# Patient Record
Sex: FEMALE | Race: WHITE
[De-identification: names, ages, dates, MRNs, and addresses within clinical notes are randomized per-mention and may not be internally consistent; named-entity substitution may affect disease eponyms.]

---

## 2019-03-24 ENCOUNTER — HOSPITAL ENCOUNTER (OUTPATIENT)
Dept: HOSPITAL 82 - ED | Age: 64
Setting detail: OBSERVATION
LOS: 1 days | Discharge: HOME | End: 2019-03-25
Attending: INTERNAL MEDICINE | Admitting: INTERNAL MEDICINE
Payer: MEDICARE

## 2019-03-24 VITALS — WEIGHT: 79.37 LBS | HEIGHT: 65 IN | BODY MASS INDEX: 13.22 KG/M2

## 2019-03-24 VITALS — SYSTOLIC BLOOD PRESSURE: 143 MMHG | DIASTOLIC BLOOD PRESSURE: 85 MMHG

## 2019-03-24 DIAGNOSIS — Z99.3: ICD-10-CM

## 2019-03-24 DIAGNOSIS — F32.9: ICD-10-CM

## 2019-03-24 DIAGNOSIS — R64: ICD-10-CM

## 2019-03-24 DIAGNOSIS — R42: ICD-10-CM

## 2019-03-24 DIAGNOSIS — F41.9: ICD-10-CM

## 2019-03-24 DIAGNOSIS — M24.575: ICD-10-CM

## 2019-03-24 DIAGNOSIS — M24.574: ICD-10-CM

## 2019-03-24 DIAGNOSIS — M19.90: ICD-10-CM

## 2019-03-24 DIAGNOSIS — R53.1: ICD-10-CM

## 2019-03-24 DIAGNOSIS — I10: ICD-10-CM

## 2019-03-24 DIAGNOSIS — G89.29: Primary | ICD-10-CM

## 2019-03-24 DIAGNOSIS — M06.9: ICD-10-CM

## 2019-03-24 DIAGNOSIS — M24.562: ICD-10-CM

## 2019-03-24 DIAGNOSIS — E03.9: ICD-10-CM

## 2019-03-24 DIAGNOSIS — M24.561: ICD-10-CM

## 2019-03-24 LAB
ALBUMIN SERPL-MCNC: 3.3 G/DL (ref 3.2–5)
ALP SERPL-CCNC: 95 U/L (ref 38–126)
ANION GAP SERPL CALCULATED.3IONS-SCNC: 14 MMOL/L
AST SERPL-CCNC: 20 U/L (ref 9–36)
BASOPHILS NFR BLD AUTO: 0 % (ref 0–3)
BILIRUB UR QL STRIP.AUTO: NEGATIVE
BUN SERPL-MCNC: 14 MG/DL (ref 8–23)
BUN/CREAT SERPL: 16
CHLORIDE SERPL-SCNC: 108 MMOL/L (ref 95–108)
CK SERPL-CCNC: 70 U/L (ref 30–165)
CO2 SERPL-SCNC: 24 MMOL/L (ref 22–30)
COLOR UR AUTO: YELLOW
CREAT SERPL-MCNC: 0.8 MG/DL (ref 0.5–1)
EOSINOPHIL NFR BLD AUTO: 0 % (ref 0–8)
ERYTHROCYTE [DISTWIDTH] IN BLOOD BY AUTOMATED COUNT: 15.7 % (ref 11.5–15.5)
GLUCOSE UR STRIP.AUTO-MCNC: NEGATIVE MG/DL
HCT VFR BLD AUTO: 34.9 % (ref 37–47)
HGB BLD-MCNC: 11 G/DL (ref 12–16)
HGB UR QL STRIP.AUTO: (no result)
IMM GRANULOCYTES NFR BLD: 0.4 % (ref 0–5)
KETONES UR STRIP.AUTO-MCNC: NEGATIVE MG/DL
LEUKOCYTE ESTERASE UR QL STRIP.AUTO: NEGATIVE
LYMPHOCYTES NFR BLD: 13 % (ref 15–41)
MCH RBC QN AUTO: 28.4 PG  CALC (ref 26–32)
MCHC RBC AUTO-ENTMCNC: 31.5 G/L CALC (ref 32–36)
MCV RBC AUTO: 89.9 FL  CALC (ref 80–100)
MONOCYTES NFR BLD AUTO: 7 % (ref 2–13)
NEUTROPHILS # BLD AUTO: 6.36 THOU/UL (ref 2–7.15)
NEUTROPHILS NFR BLD AUTO: 79 % (ref 42–76)
NITRITE UR QL STRIP.AUTO: NEGATIVE
PH UR STRIP.AUTO: 6 [PH] (ref 4.5–8)
PLATELET # BLD AUTO: 439 THOU/UL (ref 130–400)
POTASSIUM SERPL-SCNC: 4.4 MMOL/L (ref 3.5–5.1)
PROT SERPL-MCNC: 7.2 G/DL (ref 6.3–8.2)
PROT UR QL STRIP.AUTO: NEGATIVE MG/DL
RBC # BLD AUTO: 3.88 MILL/UL (ref 4.2–5.6)
SODIUM SERPL-SCNC: 141 MMOL/L (ref 137–146)
SP GR UR STRIP.AUTO: 1.01
UROBILINOGEN UR QL STRIP.AUTO: 0.2 E.U./DL

## 2019-03-24 NOTE — NUR
CONSULT FOR PT. PT STATES "I AM OUT OF MY PAIN MEDICATION
BECAUSE MY NEPHEW, PJ, STEALS MY PAIN MEDICATION. LAST NIGHT I HAD TO TAKE
BENADRYL BECAUSE I WAS GOING THROUGH WITHDRAWAL AS ALL MY PAIN MEDICATIONS ARE
MISSING AND I HAVE NONE TO TAKE WHEN I NEED THEM.

## 2019-03-24 NOTE — NUR
[facility] dispatch advised of pt stating that her nephem PJ takes her medications.
Pt states she has Dilaudid, Klonopin and Fentanyl, buthe only takes the
dilaudid and fentanyl. Explained to pt that we will have to report this to the
police because its against the law to take medication that isn't prescribed to
you. Pt states " I don't want to get him into trouble, but he takes it from
me." Advised again that it was against the law and I would bel notifying the
police. "Well he gets mad so I just give him the bottles and he takes what he
wants" Explained to pt that she is also committing a crime by distributing
medication without a license to someone that it isn't written for. "How do I
let him know since I don't know his cellphone number?" Advised she could talk
to the police when they get here, but she should be ashamed of herself for
condoning that behavior.

## 2019-03-24 NOTE — NUR
PT REQUESTED SLEEPING PILL DURING ASSESSMENT. WRITER NOTED PT TO BE VERY
DROWSY. VS TAKEN AT THIS TIME 140/81 85 99.3 18 96% ON RA. . PT NOT ABLE
TO KEEP EYES OPEN TO ANSWER QUESTIONS. SLEEPING PILL NOT GIVEN AT THIS TIME.
WILL CONTINUE TO MONITOR.

## 2019-03-24 NOTE — NUR
REPORT CALLED TO FLOOR FOR CONTINUATION OF CARE.  PT REPORT GIVEN TO CESIA ANDRE.
FOR CARE UNTIL PT IS READY TO BE TAKEN TO MED SURG

## 2019-03-24 NOTE — NUR
PT HAS SEVERE RHEUMOTOID ARTHRITIS, RIGHT HAND HAND IS BENT AND CRIPPLED
LOOKING, PT APPEARS TO BE EMACIATED.  SHE STATES SHE LIVES WITH NEPHEW BUT
HASNT FELT LIKE EATING OR DRINKING FOR "AWHILE"

## 2019-03-24 NOTE — NUR
PT REMAINS ALERT/ORIENTED X3,  NO VOMITING SINCE ARRIVAL. VITAL SIGNS STABLE. 
CALL LIGHT WITHIN REACH

## 2019-03-24 NOTE — NUR
PT ARRIVED TO FLOOR VIA STRETCHER ACCOMPAINED BY ER STAFF. PT ALERT AND
ORIENTED. EMS SITE NOTED TO [facility] SITE APPEARS HEALTHY. SKIN INTACT. PT
NON-AMBULATORY TRANSFERED FROM STRETCHER TO BED X2 PERSON ASSIST. PT WEARS
BRIEF FOR PERIODS OF INCONTINENCE. ORIENTED TO ROOM AND CALL LIGHT SYSTEM.
DISCUSSED POC. PT VERBALIZED UNDERSTANDING. CALL LIGHT WITHIN REACH. WILL
CONTINUE TO MONITOR.

## 2019-03-25 VITALS — SYSTOLIC BLOOD PRESSURE: 159 MMHG | DIASTOLIC BLOOD PRESSURE: 93 MMHG

## 2019-03-25 VITALS — SYSTOLIC BLOOD PRESSURE: 133 MMHG | DIASTOLIC BLOOD PRESSURE: 76 MMHG

## 2019-03-25 VITALS — SYSTOLIC BLOOD PRESSURE: 146 MMHG | DIASTOLIC BLOOD PRESSURE: 92 MMHG

## 2019-03-25 VITALS — DIASTOLIC BLOOD PRESSURE: 76 MMHG | SYSTOLIC BLOOD PRESSURE: 123 MMHG

## 2019-03-25 LAB
ALBUMIN SERPL-MCNC: 2.9 G/DL (ref 3.2–5)
ALP SERPL-CCNC: 89 U/L (ref 38–126)
ANION GAP SERPL CALCULATED.3IONS-SCNC: 12 MMOL/L
AST SERPL-CCNC: 19 U/L (ref 9–36)
BASOPHILS NFR BLD AUTO: 0 % (ref 0–3)
BUN SERPL-MCNC: 11 MG/DL (ref 8–23)
BUN/CREAT SERPL: 15
CHLORIDE SERPL-SCNC: 107 MMOL/L (ref 95–108)
CO2 SERPL-SCNC: 23 MMOL/L (ref 22–30)
CREAT SERPL-MCNC: 0.8 MG/DL (ref 0.5–1)
EOSINOPHIL NFR BLD AUTO: 0 % (ref 0–8)
ERYTHROCYTE [DISTWIDTH] IN BLOOD BY AUTOMATED COUNT: 15.8 % (ref 11.5–15.5)
HCT VFR BLD AUTO: 33.4 % (ref 37–47)
HGB BLD-MCNC: 10.5 G/DL (ref 12–16)
IMM GRANULOCYTES NFR BLD: 0.3 % (ref 0–5)
LYMPHOCYTES NFR BLD: 27 % (ref 15–41)
MCH RBC QN AUTO: 28.1 PG  CALC (ref 26–32)
MCHC RBC AUTO-ENTMCNC: 31.4 G/L CALC (ref 32–36)
MCV RBC AUTO: 89.3 FL  CALC (ref 80–100)
MONOCYTES NFR BLD AUTO: 7 % (ref 2–13)
NEUTROPHILS # BLD AUTO: 5.91 THOU/UL (ref 2–7.15)
NEUTROPHILS NFR BLD AUTO: 66 % (ref 42–76)
PLATELET # BLD AUTO: 495 THOU/UL (ref 130–400)
POTASSIUM SERPL-SCNC: 3.7 MMOL/L (ref 3.5–5.1)
PROT SERPL-MCNC: 6.7 G/DL (ref 6.3–8.2)
RBC # BLD AUTO: 3.74 MILL/UL (ref 4.2–5.6)
SODIUM SERPL-SCNC: 138 MMOL/L (ref 137–146)

## 2019-03-25 NOTE — NUR
PT RESTING IN RIGHT SIDE LAYING POSITION,A&O X3;VS OBTAINED AND ASSESSMENT
COMPLETED;PT REPORTS CHRONIC PAIN, PRN PAIN MEDICATION PROVIDED BUT PT REFUSED
AT THIS TIME;RESPIRATIONS EVEN AND UNLABORED ON RA,CLEAR LUNG SOUNDS;ABDOMEN
SOFT ON PALPATION AND ACTIVE IN ALL 4 QUADRANTS;STRONG PEDAL PULSES;SKIN
INTACT;EMS #20G TO [facility] INFUSING D5 1/2 NS @ 100ML/HR,SITE APPEARS HEALTHY;TELE
MONITORING IN PLACE;PT DENIES ANY ADDITIONAL NEEDS AT THIS TIME AND IS
ENCOURAGED TO CALL FOR ASSISTANCE IF NEEDED;FALL PRECAUTIONS IN PLACE WITH
CALL LIGHT IN REACH;WILL CONTINUE TO MONITOR

## 2019-03-25 NOTE — NUR
PT AWAKE, ALERT AND ORIENTED. PT REPOSITIONED IN BED. PT REQUESTING DILUADID.
EDUCATED PT ON MEDICATIONS ORDERED BY PHYSICIAN. WILL ADMINISTER APAP,
CONTINUE TO MONITOR AND REASSESS.

## 2019-03-25 NOTE — NUR
Discharge instructions given. Patient verbalizes understanding of same.
Discharged in stable condition via Wheelchair to  with
*Other. All belongings sent with pt.

## 2019-03-25 NOTE — NUR
REPORT RECEIVED FROM LUIS ALBERTO MARIE;PT APPEARS TO BE SLEEPING IN SUPINE
POSITION;RESPIRATIONS APPEAR EVEN AND UNLABORED ON RA;NO S/S OF DISTRESS
NOTED;CONTACT PRECAUTIONS IN PLACE;FALL PRECAUTIONS NOTED WITH BED IN THE
LOWEST POSITION AND CALL LIGHT IN REACH;WILL CONTINUE TO MONITOR

## 2019-03-25 NOTE — NUR
ALL DISCHARGE INSTRUCTIONS PROVIDED AT THIS TIME,QUESTIONS ANSWERED;IV SITE
REMOVED WITH CATHETER INTACT;AWAITING TRANSPORTATION HOME.

## 2019-03-25 NOTE — NUR
PT RESTING IN SEMI FOWLERS POSITION;RESPIRATIONS EVEN AND UNLABORED ON RA;PT
DENIES ANY CURRENT NEEDS;IV SITE TO [facility] SALINE LOCKED AT THIS TIME PER
ORDER;TELE MONITORING IN PLACE;PT ENCOURAGED TO CALL FOR ASSISTANCE IF
NEEDED;FALL PRECAUTIONS IN PLACE WITH CALL LIGHT IN REACH;WILL CONTINUE TO
MONITOR

## 2019-03-26 ENCOUNTER — HOSPITAL ENCOUNTER (INPATIENT)
Dept: HOSPITAL 82 - ED | Age: 64
LOS: 2 days | Discharge: HOME | DRG: 92 | End: 2019-03-28
Attending: INTERNAL MEDICINE | Admitting: INTERNAL MEDICINE
Payer: MEDICARE

## 2019-03-26 VITALS — BODY MASS INDEX: 10.39 KG/M2 | HEIGHT: 65 IN | WEIGHT: 62.39 LBS

## 2019-03-26 VITALS — DIASTOLIC BLOOD PRESSURE: 96 MMHG | SYSTOLIC BLOOD PRESSURE: 148 MMHG

## 2019-03-26 DIAGNOSIS — M24.562: ICD-10-CM

## 2019-03-26 DIAGNOSIS — H51.9: ICD-10-CM

## 2019-03-26 DIAGNOSIS — R63.6: ICD-10-CM

## 2019-03-26 DIAGNOSIS — M62.50: ICD-10-CM

## 2019-03-26 DIAGNOSIS — M06.9: ICD-10-CM

## 2019-03-26 DIAGNOSIS — D63.8: ICD-10-CM

## 2019-03-26 DIAGNOSIS — F32.9: ICD-10-CM

## 2019-03-26 DIAGNOSIS — M19.90: ICD-10-CM

## 2019-03-26 DIAGNOSIS — G89.29: Primary | ICD-10-CM

## 2019-03-26 DIAGNOSIS — F11.20: ICD-10-CM

## 2019-03-26 DIAGNOSIS — F41.9: ICD-10-CM

## 2019-03-26 DIAGNOSIS — M24.561: ICD-10-CM

## 2019-03-26 DIAGNOSIS — M24.575: ICD-10-CM

## 2019-03-26 DIAGNOSIS — I10: ICD-10-CM

## 2019-03-26 DIAGNOSIS — M24.574: ICD-10-CM

## 2019-03-26 LAB
ALBUMIN SERPL-MCNC: 3.4 G/DL (ref 3.2–5)
ALP SERPL-CCNC: 103 U/L (ref 38–126)
ANION GAP SERPL CALCULATED.3IONS-SCNC: 15 MMOL/L
AST SERPL-CCNC: 34 U/L (ref 9–36)
BARBITURATES UR-MCNC: NEGATIVE UG/ML
BASOPHILS NFR BLD AUTO: 0 % (ref 0–3)
BILIRUB UR QL STRIP.AUTO: NEGATIVE
BUN SERPL-MCNC: 17 MG/DL (ref 8–23)
BUN/CREAT SERPL: 23
CHLORIDE SERPL-SCNC: 108 MMOL/L (ref 95–108)
CO2 SERPL-SCNC: 18 MMOL/L (ref 22–30)
COCAINE UR-MCNC: NEGATIVE NG/ML
COLOR UR AUTO: YELLOW
CREAT SERPL-MCNC: 0.7 MG/DL (ref 0.5–1)
EOSINOPHIL NFR BLD AUTO: 0 % (ref 0–8)
ERYTHROCYTE [DISTWIDTH] IN BLOOD BY AUTOMATED COUNT: 16 % (ref 11.5–15.5)
GLUCOSE UR STRIP.AUTO-MCNC: NEGATIVE MG/DL
HCT VFR BLD AUTO: 39.4 % (ref 37–47)
HGB BLD-MCNC: 12 G/DL (ref 12–16)
HGB UR QL STRIP.AUTO: (no result)
IMM GRANULOCYTES NFR BLD: 2 % (ref 0–5)
KETONES UR STRIP.AUTO-MCNC: (no result) MG/DL
LEUKOCYTE ESTERASE UR QL STRIP.AUTO: NEGATIVE
LYMPHOCYTES NFR BLD: 8 % (ref 15–41)
MCH RBC QN AUTO: 27.9 PG  CALC (ref 26–32)
MCHC RBC AUTO-ENTMCNC: 30.5 G/L CALC (ref 32–36)
MCV RBC AUTO: 91.6 FL  CALC (ref 80–100)
METHADONE SERPL-MCNC: NEGATIVE NG/ML
MONOCYTES NFR BLD AUTO: 4 % (ref 2–13)
NEUTROPHILS # BLD AUTO: 16.42 THOU/UL (ref 2–7.15)
NEUTROPHILS NFR BLD AUTO: 86 % (ref 42–76)
NITRITE UR QL STRIP.AUTO: NEGATIVE
OXCYCODONE: NEGATIVE
PH UR STRIP.AUTO: 6 [PH] (ref 4.5–8)
PLATELET # BLD AUTO: 412 THOU/UL (ref 130–400)
POTASSIUM SERPL-SCNC: 3 MMOL/L (ref 3.5–5.1)
PROT SERPL-MCNC: 7.6 G/DL (ref 6.3–8.2)
PROT UR QL STRIP.AUTO: NEGATIVE MG/DL
RBC # BLD AUTO: 4.3 MILL/UL (ref 4.2–5.6)
SODIUM SERPL-SCNC: 139 MMOL/L (ref 137–146)
SP GR UR STRIP.AUTO: 1.02
TETRAHYDROCANNABIONOL: NEGATIVE
TRICYLIC ANTIDEPRESSANTS: POSITIVE
UROBILINOGEN UR QL STRIP.AUTO: 0.2 E.U./DL

## 2019-03-26 NOTE — NUR
PTS SISTERS AT BEDSIDE. VSS. PT AFEBRILE. COMPLETED 1L EMS IV FLUIDS.PT ALERT
AND RESPONDS APPROPRIATELY

## 2019-03-26 NOTE — NUR
PT ARRIVED VIA EMS STRETCHER TO RM 9, ALERT AND RESPONSIVE, VERBALLY
APPROPRIATE, NOT ACCOMPANIED BY FAMILY OR FRIENDS.

## 2019-03-26 NOTE — NUR
63 yr old white female admitted to [facility] as med surg tele overflow. transferred
x2 to bed. bed weight obtained. pt admits to "no pain medicine @ home." also
admits "transport company couldn't take me to the appointment."

## 2019-03-26 NOTE — NUR
CLEANSED OF DARK LOOSE STOOL AND UA VIA STRAIGHT CATH PERFORMED. PT TOELRATED
FAIR. PT STATES SHE HAS BEEN "OUT OF MY PAIN MEDICINE FOR 24HOURS".

## 2019-03-26 NOTE — NUR
PT HAS SLIGHT SWELLING TO RT UPPER EYELID. PT DENIES FALL OR INJURY. PT ALERT
AND ORIENTED. MAEW. NIH NEGATIVE.

## 2019-03-26 NOTE — NUR
PATIENT INFORMED OF PLANS TO ADMIT. MEDICATED WITH PO KCL AND SECOND ABX GIVEN.
PATIENT C/O INCREASED BACK PAIN. STATES SHE RAN OUT OF HER FENTANYL AND
DILAUDID ON MONDAY.

## 2019-03-27 VITALS — SYSTOLIC BLOOD PRESSURE: 153 MMHG | DIASTOLIC BLOOD PRESSURE: 83 MMHG

## 2019-03-27 VITALS — DIASTOLIC BLOOD PRESSURE: 84 MMHG | SYSTOLIC BLOOD PRESSURE: 143 MMHG

## 2019-03-27 VITALS — SYSTOLIC BLOOD PRESSURE: 127 MMHG | DIASTOLIC BLOOD PRESSURE: 71 MMHG

## 2019-03-27 VITALS — DIASTOLIC BLOOD PRESSURE: 85 MMHG | SYSTOLIC BLOOD PRESSURE: 145 MMHG

## 2019-03-27 VITALS — DIASTOLIC BLOOD PRESSURE: 81 MMHG | SYSTOLIC BLOOD PRESSURE: 147 MMHG

## 2019-03-27 VITALS — DIASTOLIC BLOOD PRESSURE: 76 MMHG | SYSTOLIC BLOOD PRESSURE: 140 MMHG

## 2019-03-27 VITALS — DIASTOLIC BLOOD PRESSURE: 76 MMHG | SYSTOLIC BLOOD PRESSURE: 151 MMHG

## 2019-03-27 LAB
ALBUMIN SERPL-MCNC: 2.6 G/DL (ref 3.2–5)
ALP SERPL-CCNC: 84 U/L (ref 38–126)
ANION GAP SERPL CALCULATED.3IONS-SCNC: 12 MMOL/L
AST SERPL-CCNC: 26 U/L (ref 9–36)
BASOPHILS NFR BLD AUTO: 0 % (ref 0–3)
BUN SERPL-MCNC: 12 MG/DL (ref 8–23)
BUN/CREAT SERPL: 19
CHLORIDE SERPL-SCNC: 115 MMOL/L (ref 95–108)
CO2 SERPL-SCNC: 16 MMOL/L (ref 22–30)
CREAT SERPL-MCNC: 0.6 MG/DL (ref 0.5–1)
EOSINOPHIL NFR BLD AUTO: 0 % (ref 0–8)
ERYTHROCYTE [DISTWIDTH] IN BLOOD BY AUTOMATED COUNT: 16 % (ref 11.5–15.5)
HCT VFR BLD AUTO: 32.5 % (ref 37–47)
HGB BLD-MCNC: 10.2 G/DL (ref 12–16)
IMM GRANULOCYTES NFR BLD: 0.7 % (ref 0–5)
LYMPHOCYTES NFR BLD: 10 % (ref 15–41)
MCH RBC QN AUTO: 28.4 PG  CALC (ref 26–32)
MCHC RBC AUTO-ENTMCNC: 31.4 G/L CALC (ref 32–36)
MCV RBC AUTO: 90.5 FL  CALC (ref 80–100)
MONOCYTES NFR BLD AUTO: 5 % (ref 2–13)
NEUTROPHILS # BLD AUTO: 13.96 THOU/UL (ref 2–7.15)
NEUTROPHILS NFR BLD AUTO: 84 % (ref 42–76)
PLATELET # BLD AUTO: 349 THOU/UL (ref 130–400)
POTASSIUM SERPL-SCNC: 3.7 MMOL/L (ref 3.5–5.1)
PROT SERPL-MCNC: 6.2 G/DL (ref 6.3–8.2)
RBC # BLD AUTO: 3.59 MILL/UL (ref 4.2–5.6)
SODIUM SERPL-SCNC: 139 MMOL/L (ref 137–146)

## 2019-03-27 NOTE — NUR
Patient is resting in bed. Patient complaints of pain 7/10.
V/S wnl. Patient given prn po dilaudid for pain. Breath sounds
clear. Patient is very frail and week. She is contracted in her hands and
legs. Skin intact. No S&S of distress. Patient repositioned for comfort. Will
continue to monitor patient progress.

## 2019-03-27 NOTE — NUR
PT STATES NO APPETITE FOR BREAKFAST.  PT COMPLAINS OF PAIN TO LOWER
EXTREMITIES AND BACK, REPOSITIONED FOR COMFORT.

## 2019-03-28 VITALS — DIASTOLIC BLOOD PRESSURE: 81 MMHG | SYSTOLIC BLOOD PRESSURE: 139 MMHG

## 2019-03-28 VITALS — DIASTOLIC BLOOD PRESSURE: 79 MMHG | SYSTOLIC BLOOD PRESSURE: 135 MMHG

## 2019-03-28 VITALS — SYSTOLIC BLOOD PRESSURE: 140 MMHG | DIASTOLIC BLOOD PRESSURE: 89 MMHG

## 2019-03-28 LAB
ALBUMIN SERPL-MCNC: 2.5 G/DL (ref 3.2–5)
ALP SERPL-CCNC: 76 U/L (ref 38–126)
ANION GAP SERPL CALCULATED.3IONS-SCNC: 11 MMOL/L
AST SERPL-CCNC: 18 U/L (ref 9–36)
BASOPHILS NFR BLD AUTO: 0 % (ref 0–3)
BILIRUB UR QL STRIP.AUTO: NEGATIVE
BUN SERPL-MCNC: 9 MG/DL (ref 8–23)
BUN/CREAT SERPL: 16
CHLORIDE SERPL-SCNC: 113 MMOL/L (ref 95–108)
CLARITY UR: (no result)
CO2 SERPL-SCNC: 18 MMOL/L (ref 22–30)
COLOR UR AUTO: YELLOW
CREAT SERPL-MCNC: 0.6 MG/DL (ref 0.5–1)
EOSINOPHIL NFR BLD AUTO: 0 % (ref 0–8)
ERYTHROCYTE [DISTWIDTH] IN BLOOD BY AUTOMATED COUNT: 16.6 % (ref 11.5–15.5)
GLUCOSE UR STRIP.AUTO-MCNC: NEGATIVE MG/DL
HCT VFR BLD AUTO: 31.8 % (ref 37–47)
HGB BLD-MCNC: 10 G/DL (ref 12–16)
HGB UR QL STRIP.AUTO: (no result)
IMM GRANULOCYTES NFR BLD: 1.6 % (ref 0–5)
KETONES UR STRIP.AUTO-MCNC: (no result) MG/DL
LEUKOCYTE ESTERASE UR QL STRIP.AUTO: NEGATIVE
LYMPHOCYTES NFR BLD: 13 % (ref 15–41)
MCH RBC QN AUTO: 28.3 PG  CALC (ref 26–32)
MCHC RBC AUTO-ENTMCNC: 31.4 G/L CALC (ref 32–36)
MCV RBC AUTO: 90.1 FL  CALC (ref 80–100)
MONOCYTES NFR BLD AUTO: 6 % (ref 2–13)
NEUTROPHILS # BLD AUTO: 7.54 THOU/UL (ref 2–7.15)
NEUTROPHILS NFR BLD AUTO: 79 % (ref 42–76)
NITRITE UR QL STRIP.AUTO: NEGATIVE
PH UR STRIP.AUTO: 5 [PH] (ref 4.5–8)
PLATELET # BLD AUTO: 292 THOU/UL (ref 130–400)
POTASSIUM SERPL-SCNC: 3.2 MMOL/L (ref 3.5–5.1)
PROT SERPL-MCNC: 5.9 G/DL (ref 6.3–8.2)
PROT UR STRIP.AUTO-MCNC: NEGATIVE MG/DL
RBC # BLD AUTO: 3.53 MILL/UL (ref 4.2–5.6)
SODIUM SERPL-SCNC: 139 MMOL/L (ref 137–146)
SP GR UR STRIP.AUTO: 1.02
SQUAMOUS URNS QL MICRO: (no result) EPI/HPF
UROBILINOGEN UR QL STRIP.AUTO: 0.2 E.U./DL

## 2019-03-28 NOTE — NUR
CONFIRMED PLACEMENT BACK HOME WITH [facility], CASE MANAGEMENT. STATED PT HAS PRIOR
PLACEMENT TO REHAB & REFUSED PLACEMENT IN REHAB AGAIN. STATED NEPHEW LIVES
WITH PT TO HELP CARE FOR HER. DISCUSSED OPTIONS FOR ALTERNATE PLACEMENT D/T
CONCERNS ABOUT ABILITY TO TAKE CARE OF SELF & RAPID READMIT AFTER LAST DC.
NOTIFIED PT CAN BE TRANSFERED HOME BY WC AFTER CALLING [employer].

## 2019-03-28 NOTE — NUR
PT LEAVES FOR HOME VIA [employer]SP[employer], VERBALIZES UNDERSTANDING OF DC
INSTRUCTIONS.  [facility] CALLED EARLIER AND WAS UPDATED ON IMPENDING
DISCHARGE, STATES THAT SOMEONE WILL BE AT HER HOUSE WHEN SHE IS DELIVERED
THERE.

## 2019-03-28 NOTE — NUR
PT SEEN AWAKE, ALERT THIS MORNING.  SHE DOES HAVE TREMORS TODAY
INTERMITTENTLY.  PT DRANK ENSURE THIS MORNING, LATER A CUP OF APPLESAUCE.  PT
SEEN BY KECIA TODAY, PLAN IS TO DISCHARGE BACK HOME TODAY.

## 2019-03-28 NOTE — NUR
CALLED [phone] FOR TRANSPORT HOME. MARIJA STATES [facility] WILL NEED TO BORROW
A WC TO TAKE PT HOME BUT WILL BRING IT BACK. WILL BE HERE AS SOON AS THEY CAN
[facility] CASE MANAGJONAH, AWARE OF BORROWING WC, NEED INFO TO HOLD TRANSPORT
ACCOUNTABLE.

## 2019-03-28 NOTE — NUR
Patient resting in bed. No S&S of distress. No change in previous assessment.
Will continue to monitor patient progress.

## 2024-10-29 NOTE — NUR
PT C/O NOT BEING ABLE TO SLEEP REQUESTING SLEEPING PILL AGAIN. REORIENTED PT
TO TIME. PT BECAME ANXIOUS. ADMINISTERED PRN XANAX AT THIS TIME. NO S/S OF
PAIN NOTED. CALL LIGHT WITHIN REACH. WILL CONTINUE TO MONITOR. [Painful [place]] : painful [place] [Genital bacterial infection] : genital bacterial infection [Negative] : Heme/Lymph 29-Oct-2024 13:24